# Patient Record
Sex: FEMALE | Race: OTHER
[De-identification: names, ages, dates, MRNs, and addresses within clinical notes are randomized per-mention and may not be internally consistent; named-entity substitution may affect disease eponyms.]

---

## 2019-01-23 ENCOUNTER — HOSPITAL ENCOUNTER (EMERGENCY)
Dept: HOSPITAL 72 - EMR | Age: 39
Discharge: HOME | End: 2019-01-23
Payer: MEDICAID

## 2019-01-23 VITALS — SYSTOLIC BLOOD PRESSURE: 118 MMHG | DIASTOLIC BLOOD PRESSURE: 77 MMHG

## 2019-01-23 VITALS — DIASTOLIC BLOOD PRESSURE: 60 MMHG | SYSTOLIC BLOOD PRESSURE: 116 MMHG

## 2019-01-23 VITALS — BODY MASS INDEX: 21 KG/M2 | WEIGHT: 123 LBS | HEIGHT: 64 IN

## 2019-01-23 DIAGNOSIS — R10.30: ICD-10-CM

## 2019-01-23 DIAGNOSIS — I88.9: Primary | ICD-10-CM

## 2019-01-23 DIAGNOSIS — K59.00: ICD-10-CM

## 2019-01-23 LAB
APPEARANCE UR: CLEAR
APTT PPP: (no result) S
GLUCOSE UR STRIP-MCNC: NEGATIVE MG/DL
KETONES UR QL STRIP: NEGATIVE
LEUKOCYTE ESTERASE UR QL STRIP: (no result)
NITRITE UR QL STRIP: NEGATIVE
PH UR STRIP: 7 [PH] (ref 4.5–8)
PROT UR QL STRIP: NEGATIVE
SP GR UR STRIP: 1.01 (ref 1–1.03)
UROBILINOGEN UR-MCNC: NORMAL MG/DL (ref 0–1)

## 2019-01-23 PROCEDURE — 74018 RADEX ABDOMEN 1 VIEW: CPT

## 2019-01-23 PROCEDURE — 99283 EMERGENCY DEPT VISIT LOW MDM: CPT

## 2019-01-23 PROCEDURE — 81003 URINALYSIS AUTO W/O SCOPE: CPT

## 2019-01-23 PROCEDURE — 81025 URINE PREGNANCY TEST: CPT

## 2019-01-23 NOTE — NUR
ED Nurse Note:



pt walked in c/o neck pain and swelling, abd pain and rectal pain x 3 wks, pt 
reports when she has bm it hurts more after. pt denies diarrhea or 
constipation, denies blood in bm. pt states she doens't know if she had 
hemorroids or not. abd soft nondistended active bs, airway intact, resp even 
and unlabored, will cont monitor.

## 2019-01-23 NOTE — NUR
ED Nurse Note:



pt discharge instruction provided w/ prescription, pt education done via 
discussion and hand out, wristband removed, pt advised to follow up with pcp or 
return to ed if s/s worsen or new s/s develop, pt verbalized understanding and 
agrees with plan.

## 2019-01-23 NOTE — EMERGENCY ROOM REPORT
History of Present Illness


General


Chief Complaint:  Sore Throat


Source:  Patient





Present Illness


HPI


Patient presents with 2 problems.  One is that she has pain on the outside of 

her trachea on the left-hand side.  There is a bump there.  She denies any 

trauma.  She's not noticed any fever.  She's tried taking Motrin.  This is been 

present for several days.  She has had this problem once before but it resolved 

on its own.  No medications were taken.





The second problem is that she has some lower abdominal pain associated with 

straining with her stools.  She denies any vomiting or diarrhea.  She states 

she has pain in her rectum.  There is no rash.  She rates the pain 9/10 and 

aching and pressure without radiation.  However there is some suprapubic 

tenderness and fullness.  There is no blood in the stool.





On amox for pulled tooth.  Has 5 more tablets to go.





She is never been evaluated for the problem in her neck or abdomen.





She denies diabetes, hypertension, abdominal problems in the past.


Allergies:  


Coded Allergies:  


     No Known Allergies (Unverified , 17)





Patient History


Past Medical History:  see triage record


Social History:  Denies: smoking


Social History Narrative


At home


Last Menstrual Period:  20


Pregnant Now:  No


:  0


Reviewed Nursing Documentation:  PMH: Agreed; PSxH: Agreed





Nursing Documentation-PMH


Past Medical History:  No Stated History





Review of Systems


All Other Systems:  negative except mentioned in HPI





Physical Exam





Vital Signs








  Date Time  Temp Pulse Resp B/P (MAP) Pulse Ox O2 Delivery O2 Flow Rate FiO2


 


19 20:57 98.1 68 17 123/77 98 Room Air  








Sp02 EP Interpretation:  reviewed, normal


General Appearance:  well appearing, no apparent distress, GCS 15


Head:  normocephalic


Eyes:  bilateral eye normal inspection, bilateral eye PERRL


ENT:  moist mucus membranes


Neck:  full range of motion, supple, thyroid normal, tender - Lymph node left 

anterior


Respiratory:  lungs clear, normal breath sounds


Cardiovascular #1:  regular rate, rhythm


Cardiovascular #2:  2+ radial (R)


Gastrointestinal:  normal inspection, normal bowel sounds, soft, no mass, non-

distended, no guarding, no rebound, tenderness - Minimal suprapubic and left 

lower quadrant


Genitourinary:  no CVA tenderness


Musculoskeletal:  back normal, gait/station normal, normal range of motion


Neurologic:  alert, oriented x3, grossly normal


Psychiatric:  mood/affect normal


Skin:  normal inspection, warm/dry, other - Moles anterior neck





Medical Decision Making


Diagnostic Impression:  


 Primary Impression:  


 Lymphadenitis


 Additional Impressions:  


 Constipated


 Qualified Codes:  K59.00 - Constipation, unspecified


 Abdominal pain


 Qualified Codes:  R10.30 - Lower abdominal pain, unspecified


ER Course


Patient presents with 2 problems.  She has had painful swelling area in the 

left anterior neck.  Differential includes lymphadenitis, cellulitis, strain 

amongst others.  There is a tender lymph node in that area.  This is just 

lymphadenitis.  In addition she complains about abdominal pain.  Differential 

includes diverticulitis, constipation, UTI amongst others.  Evaluation will be 

with urinalysis and x-ray.





Urinalysis without evidence of infection.  Pregnancy negative.  Abdominal films 

with increased stool load.  Patient had been treated here and pain is resolved.

  She is given Bactrim for the possible lymphadenitis in her neck and advised 

to stop amoxicillin.  Discussed results of x-ray and urinalysis.





Patient is improved and stable for outpatient observation and treatment.  

Patient was advised to return if the pain increased or if she was not doing 

well.  In addition she was told she needed to follow-up with her private 

physician.





Laboratory Tests








Test


  19


21:17


 


Urine Color Pale yellow  


 


Urine Appearance Clear  


 


Urine pH 7 (4.5-8.0)  


 


Urine Specific Gravity


  1.010


(1.005-1.035)


 


Urine Protein


  Negative


(NEGATIVE)


 


Urine Glucose (UA)


  Negative


(NEGATIVE)


 


Urine Ketones


  Negative


(NEGATIVE)


 


Urine Blood


  2+ (NEGATIVE)


H


 


Urine Nitrite


  Negative


(NEGATIVE)


 


Urine Bilirubin


  Negative


(NEGATIVE)


 


Urine Urobilinogen


  Normal MG/DL


(0.0-1.0)


 


Urine Leukocyte Esterase


  1+ (NEGATIVE)


H


 


Urine RBC


  2-4 /HPF (0 -


2)  H


 


Urine WBC


  0-2 /HPF (0 -


2)


 


Urine Squamous Epithelial


Cells Few /LPF


(NONE/OCC)


 


Urine Bacteria


  Few /HPF


(NONE)


 


Urine HCG, Qualitative


  Negative


(NEGATIVE)








Other X-Ray Diagnostic Results


Other X-Ray Diagnostic Results :  


   X-Ray ordered:  Abdomen


   # of Views/Limited Vs Complete:  2 View


   Indication:  Pain


   EP Interpretation:  Yes


   Interpretation:  nonspecific bowel gas, no sbo, other - Increased stool load


   Impression:  Other


   Electronically Signed by:  Electronically signed by eKlechi Vick MD





Last Vital Signs








  Date Time  Temp Pulse Resp B/P (MAP) Pulse Ox O2 Delivery O2 Flow Rate FiO2


 


19 23:19 98.2 67 16 116/60 100 Room Air  








Status:  improved


Disposition:  HOME, SELF-CARE


Condition:  Improved


Scripts


Ibuprofen* (MOTRIN*) 600 Mg Tablet


600 MG ORAL Q6H PRN for For Pain, #20 TAB


   Prov: Kelechi Vick MD         19 


Lactulose (LACTULOSE*) 20 Gm/30 Ml Solution


30 ML ORAL BID, #240 ML 0 Refills


   Prov: Kelechi Vick MD         19 


Acetaminophen (Tylenol) 325 Mg Tablet


650 MG ORAL Q6H PRN for Prn Pain/Headache/Temp > 101, #20 TAB 0 Refills


   Prov: Kelechi Vick MD         19 


Trimethoprim/Sulfamethoxazole 160/800* (BACTRIM DS TABLET*) 1 Each Tablet


1 TAB ORAL TWICE A DAY, #14 TAB


   Prov: Kelechi Vick MD         19











Kelechi Vick MD 2019 22:27

## 2019-01-24 NOTE — DIAGNOSTIC IMAGING REPORT
Indication: Abdominal pain

 

Technique: Supine view of the abdomen

 

Comparison: none

 

Findings: Unremarkable bowel gas pattern. No unusual masses or calcifications. The

bones are unremarkable

 

Impression: Negative

## 2019-02-02 ENCOUNTER — HOSPITAL ENCOUNTER (EMERGENCY)
Dept: HOSPITAL 72 - EMR | Age: 39
Discharge: HOME | End: 2019-02-02
Payer: MEDICAID

## 2019-02-02 VITALS — SYSTOLIC BLOOD PRESSURE: 140 MMHG | DIASTOLIC BLOOD PRESSURE: 85 MMHG

## 2019-02-02 VITALS — HEIGHT: 62 IN | BODY MASS INDEX: 22.63 KG/M2 | WEIGHT: 123 LBS

## 2019-02-02 VITALS — DIASTOLIC BLOOD PRESSURE: 85 MMHG | SYSTOLIC BLOOD PRESSURE: 140 MMHG

## 2019-02-02 DIAGNOSIS — F41.9: ICD-10-CM

## 2019-02-02 DIAGNOSIS — R07.89: Primary | ICD-10-CM

## 2019-02-02 PROCEDURE — 99283 EMERGENCY DEPT VISIT LOW MDM: CPT

## 2019-02-02 PROCEDURE — 93005 ELECTROCARDIOGRAM TRACING: CPT

## 2019-02-02 NOTE — NUR
ED Nurse Note:



Patient walk in c/o pressure chest pain, left sided weakness, blurry vision 
since yesterday at 1200. pt stating 5 /10 pain on chest that is niot radiating. 
seen by rayna. will continue to monitor.

## 2019-02-02 NOTE — EMERGENCY ROOM REPORT
History of Present Illness


General


Chief Complaint:  Chest Pain


Source:  Patient





Present Illness


HPI


This is a 38-year-old female with no past medical history.  She presents with 

chief complaint of chest pain and left-sided numbness.  She's been here 

multiple time for the same thing.  Denies any fever chills been ongoing for 

last 2 days.  She does complain of feeling nervous.  No nausea no vomiting.  No 

radiation.  No focal deficit.  Denies any other complaint.


Allergies:  


Coded Allergies:  


     No Known Allergies (Unverified , 8/5/17)





Patient History


Past Medical History:  none, see triage record, old chart reviewed


Past Surgical History:  none


Pertinent Family History:  none


Social History:  Denies: smoking


Last Menstrual Period:  1/3/2019


Pregnant Now:  No


Immunizations:  other


Reviewed Nursing Documentation:  PMH: Agreed; PSxH: Agreed





Nursing Documentation-PMH


Past Medical History:  No Stated History





Review of Systems


Eye:  Denies: eye pain, blurred vision


ENT:  Denies: ear pain, nose congestion, throat swelling


Respiratory:  Denies: cough, shortness of breath


Cardiovascular:  Reports: chest pain; Denies: palpitations


Gastrointestinal:  Denies: abdominal pain, diarrhea, nausea, vomiting


Musculoskeletal:  Denies: back pain, joint pain


Skin:  Denies: rash


Neurological:  Denies: headache, numbness


Endocrine:  Denies: increased thirst, increased urine


Hematologic/Lymphatic:  Denies: easy bruising


All Other Systems:  negative except mentioned in HPI





Physical Exam





Vital Signs








  Date Time  Temp Pulse Resp B/P (MAP) Pulse Ox O2 Delivery O2 Flow Rate FiO2


 


2/2/19 20:28 97.3 78 16 140/85 98 Room Air  





vitals normal


Sp02 EP Interpretation:  reviewed, normal


General Appearance:  well appearing, no apparent distress, alert


Head:  normocephalic, atraumatic


Eyes:  bilateral eye PERRL, bilateral eye EOMI


ENT:  hearing grossly normal, normal pharynx


Neck:  full range of motion, supple, no meningismus


Respiratory:  chest non-tender, lungs clear, normal breath sounds


Cardiovascular #1:  regular rate, rhythm, no murmur


Gastrointestinal:  normal bowel sounds, non tender, no mass, no organomegaly, 

no bruit, non-distended


Musculoskeletal:  back normal, gait/station normal, normal range of motion


Psychiatric:  mood/affect normal


Skin:  warm/dry





Medical Decision Making


Diagnostic Impression:  


 Primary Impression:  


 Chest pain


 Qualified Codes:  R07.9 - Chest pain, unspecified


 Additional Impression:  


 Anxiety


ER Course


Patient presents with atypical chest pain.  Most likely anxiety related.  No 

evidence of ACS, PE, dissection to name a few.  No evidence of CVA.


EKG Diagnostic Results


Rate:  normal


Rhythm:  NSR


ST Segments:  no acute changes


ASA given to the pt in ED:  No





Rhythm Strip Diag. Results


EP Interpretation:  yes


Rate:  61


Rhythm:  NSR, no PVC's, no ectopy





Last Vital Signs








  Date Time  Temp Pulse Resp B/P (MAP) Pulse Ox O2 Delivery O2 Flow Rate FiO2


 


2/2/19 20:28 97.3 78 16 140/85 98 Room Air  








Status:  improved


Disposition:  HOME, SELF-CARE


Condition:  Stable


Scripts


Buspirone Hcl* (BUSPAR*) 10 Mg Tablet


10 MG ORAL THREE TIMES A DAY, #15 TAB 0 Refills


   Prov: Rohan Saleem MD         2/2/19


Patient Instructions:  Nonspecific Chest Pain





Additional Instructions:  


Follow up with your Dr. in 7 days.  Return if symptom worsen.











Rohan Saleem MD Feb 2, 2019 21:16

## 2019-02-02 NOTE — NUR
ED Nurse Note:





Patient cleared for discharge per ERMD. VSS. Patient  given prescriptions and 
discharge instructions; verbalized understanding.  ID band removed. Patient 
ambulated steady with all personal belongings.

## 2019-02-05 ENCOUNTER — HOSPITAL ENCOUNTER (EMERGENCY)
Dept: HOSPITAL 72 - EMR | Age: 39
Discharge: HOME | End: 2019-02-05
Payer: MEDICAID

## 2019-02-05 VITALS — SYSTOLIC BLOOD PRESSURE: 122 MMHG | DIASTOLIC BLOOD PRESSURE: 72 MMHG

## 2019-02-05 VITALS — HEIGHT: 62 IN | BODY MASS INDEX: 22.63 KG/M2 | WEIGHT: 123 LBS

## 2019-02-05 VITALS — DIASTOLIC BLOOD PRESSURE: 72 MMHG | SYSTOLIC BLOOD PRESSURE: 122 MMHG

## 2019-02-05 VITALS — DIASTOLIC BLOOD PRESSURE: 74 MMHG | SYSTOLIC BLOOD PRESSURE: 120 MMHG

## 2019-02-05 DIAGNOSIS — R07.9: Primary | ICD-10-CM

## 2019-02-05 PROCEDURE — 99283 EMERGENCY DEPT VISIT LOW MDM: CPT

## 2019-02-05 NOTE — NUR
ED Nurse Note:



Patient walk in c/o left side chest pain radiating to back for 3x weeks. 
Patient reports burning sensation. Patient reports 5/10 pain. Patient states 
her PCP told her she had inflamation in the neck. pt is alert and oriented 
times 4. pt presents with normal sinus rhythm with other virtal signs within 
normal limits.

## 2019-02-05 NOTE — EMERGENCY ROOM REPORT
History of Present Illness


General


Chief Complaint:  Chest Pain


Source:  Patient





Present Illness


HPI


Is a 38-year-old female with no past medical history.  She presents with chief 

complaint of left-sided chest pain.  This been a chronic problem for weeks.  I 

saw her several times for this already.  She said his skin is burning in 

sensation.  His go up her neck.  No nausea no vomiting.  No fever or chills.  

She had workup with negative EKG.  Her neck pain and CT done with was negative 

for any issue.  I tried giving her BuSpar without any relief.  Denies any other 

complaint.  Nothing made it better.  Nothing made it worse.


Allergies:  


Coded Allergies:  


     No Known Allergies (Unverified , 8/5/17)





Patient History


Past Medical History:  see triage record, old chart reviewed


Past Surgical History:  none


Pertinent Family History:  none


Social History:  Denies: smoking


Last Menstrual Period:  2/3/2019


Pregnant Now:  No


Immunizations:  other


Reviewed Nursing Documentation:  PMH: Agreed; PSxH: Agreed





Nursing Documentation-PMH


Past Medical History:  No Stated History





Review of Systems


Eye:  Denies: eye pain, blurred vision


ENT:  Denies: ear pain, nose congestion, throat swelling


Respiratory:  Denies: cough, shortness of breath


Cardiovascular:  Reports: chest pain; Denies: palpitations


Gastrointestinal:  Denies: abdominal pain, diarrhea, nausea, vomiting


Musculoskeletal:  Denies: back pain, joint pain


Skin:  Denies: rash


Neurological:  Denies: headache, numbness


Endocrine:  Denies: increased thirst, increased urine


Hematologic/Lymphatic:  Denies: easy bruising


All Other Systems:  negative except mentioned in HPI





Physical Exam





Vital Signs








  Date Time  Temp Pulse Resp B/P (MAP) Pulse Ox O2 Delivery O2 Flow Rate FiO2


 


2/5/19 20:25 98.2 76 16 122/72 96 Room Air  


 


2/5/19 20:32        99





vitals normal


Sp02 EP Interpretation:  reviewed, normal


General Appearance:  well appearing, no apparent distress, alert


Head:  normocephalic, atraumatic


Eyes:  bilateral eye PERRL, bilateral eye EOMI


ENT:  hearing grossly normal, normal pharynx


Neck:  full range of motion, supple, no meningismus


Respiratory:  chest non-tender, lungs clear, normal breath sounds


Cardiovascular #1:  regular rate, rhythm, no murmur


Gastrointestinal:  normal bowel sounds, non tender, no mass, no organomegaly, 

no bruit, non-distended


Musculoskeletal:  back normal, gait/station normal, normal range of motion


Neurologic:  alert, oriented x3


Psychiatric:  anxious


Skin:  warm/dry





Medical Decision Making


Diagnostic Impression:  


 Primary Impression:  


 Chest pain


 Qualified Codes:  R07.9 - Chest pain, unspecified


ER Course


Patient with atypical chest pain.  No cardiac issue.  EKG normal.  This may be 

anxiety related.  Could be reflux.  No acute process or bloodwork or admission.

  We'll discharge home with reassurance and follow up with primary care doctor.


EKG Diagnostic Results


Rate:  normal


Rhythm:  NSR


ST Segments:  no acute changes





Rhythm Strip Diag. Results


EP Interpretation:  yes


Rate:  62


Rhythm:  NSR, no PVC's, no ectopy





Last Vital Signs








  Date Time  Temp Pulse Resp B/P (MAP) Pulse Ox O2 Delivery O2 Flow Rate FiO2


 


2/5/19 20:32 98.2 71 16 122/72 96 Room Air  


 


2/5/19 20:32        99








Status:  unchanged


Disposition:  HOME, SELF-CARE


Condition:  Stable


Scripts


Omeprazole Magnesium (PRILOSEC OTC) 20 Mg Tablet.


20 MG ORAL DAILY, #30 TAB


   Prov: Rohan Saleem MD         2/5/19


Patient Instructions:  Nonspecific Chest Pain





Additional Instructions:  


Follow-up with your doctor in 7 days.  Return if symptom worsen.











Rohan Saleem MD Feb 5, 2019 21:01

## 2019-02-05 NOTE — NUR
ED Nurse Note:



PT is DC per ERMD order. pt is alert and oriented times 4 with neuro checks 
within normal limits. pt is able to ambulate with no complications. pt vital 
signs, status, and condition is reported to ERMD prior to DC. pt is instructed 
to follow up with primary MD as soon as possible. pt has left with all 
belongings and ID band removed. pt has left with all DC paper paperwork and 
prescriptions and shows full understanding of paperwork and DC instructions. pt 
is instructed to return and report to ER if any variance in condition. pt is 
stable for Dc.

## 2019-06-16 ENCOUNTER — HOSPITAL ENCOUNTER (EMERGENCY)
Dept: HOSPITAL 72 - EMR | Age: 39
Discharge: HOME | End: 2019-06-16
Payer: MEDICAID

## 2019-06-16 VITALS — SYSTOLIC BLOOD PRESSURE: 117 MMHG | DIASTOLIC BLOOD PRESSURE: 80 MMHG

## 2019-06-16 VITALS — BODY MASS INDEX: 22.2 KG/M2 | HEIGHT: 64 IN | WEIGHT: 130 LBS

## 2019-06-16 DIAGNOSIS — R11.0: ICD-10-CM

## 2019-06-16 DIAGNOSIS — R10.30: Primary | ICD-10-CM

## 2019-06-16 DIAGNOSIS — R42: ICD-10-CM

## 2019-06-16 LAB
ADD MANUAL DIFF: NO
ALBUMIN SERPL-MCNC: 4.2 G/DL (ref 3.4–5)
ALBUMIN/GLOB SERPL: 1.4 {RATIO} (ref 1–2.7)
ALP SERPL-CCNC: 111 U/L (ref 46–116)
ALT SERPL-CCNC: 31 U/L (ref 12–78)
ANION GAP SERPL CALC-SCNC: 8 MMOL/L (ref 5–15)
APPEARANCE UR: CLEAR
APTT PPP: (no result) S
AST SERPL-CCNC: 18 U/L (ref 15–37)
BASOPHILS NFR BLD AUTO: 1.4 % (ref 0–2)
BILIRUB SERPL-MCNC: 0.2 MG/DL (ref 0.2–1)
BUN SERPL-MCNC: 15 MG/DL (ref 7–18)
CALCIUM SERPL-MCNC: 9 MG/DL (ref 8.5–10.1)
CHLORIDE SERPL-SCNC: 105 MMOL/L (ref 98–107)
CO2 SERPL-SCNC: 28 MMOL/L (ref 21–32)
CREAT SERPL-MCNC: 0.6 MG/DL (ref 0.55–1.3)
EOSINOPHIL NFR BLD AUTO: 3.1 % (ref 0–3)
ERYTHROCYTE [DISTWIDTH] IN BLOOD BY AUTOMATED COUNT: 10.1 % (ref 11.6–14.8)
GLOBULIN SER-MCNC: 2.9 G/DL
GLUCOSE UR STRIP-MCNC: NEGATIVE MG/DL
HCT VFR BLD CALC: 35.9 % (ref 37–47)
HGB BLD-MCNC: 13.1 G/DL (ref 12–16)
KETONES UR QL STRIP: NEGATIVE
LEUKOCYTE ESTERASE UR QL STRIP: NEGATIVE
LYMPHOCYTES NFR BLD AUTO: 37.9 % (ref 20–45)
MCV RBC AUTO: 86 FL (ref 80–99)
MONOCYTES NFR BLD AUTO: 8.9 % (ref 1–10)
NEUTROPHILS NFR BLD AUTO: 48.8 % (ref 45–75)
NITRITE UR QL STRIP: NEGATIVE
PH UR STRIP: 5 [PH] (ref 4.5–8)
PLATELET # BLD: 235 K/UL (ref 150–450)
POTASSIUM SERPL-SCNC: 3.9 MMOL/L (ref 3.5–5.1)
PROT UR QL STRIP: NEGATIVE
RBC # BLD AUTO: 4.18 M/UL (ref 4.2–5.4)
SODIUM SERPL-SCNC: 141 MMOL/L (ref 136–145)
SP GR UR STRIP: 1.02 (ref 1–1.03)
UROBILINOGEN UR-MCNC: NORMAL MG/DL (ref 0–1)
WBC # BLD AUTO: 7.7 K/UL (ref 4.8–10.8)

## 2019-06-16 PROCEDURE — 74176 CT ABD & PELVIS W/O CONTRAST: CPT

## 2019-06-16 PROCEDURE — 81025 URINE PREGNANCY TEST: CPT

## 2019-06-16 PROCEDURE — 36415 COLL VENOUS BLD VENIPUNCTURE: CPT

## 2019-06-16 PROCEDURE — 85025 COMPLETE CBC W/AUTO DIFF WBC: CPT

## 2019-06-16 PROCEDURE — 83690 ASSAY OF LIPASE: CPT

## 2019-06-16 PROCEDURE — 96374 THER/PROPH/DIAG INJ IV PUSH: CPT

## 2019-06-16 PROCEDURE — 96375 TX/PRO/DX INJ NEW DRUG ADDON: CPT

## 2019-06-16 PROCEDURE — 80053 COMPREHEN METABOLIC PANEL: CPT

## 2019-06-16 PROCEDURE — 99284 EMERGENCY DEPT VISIT MOD MDM: CPT

## 2019-06-16 PROCEDURE — 81003 URINALYSIS AUTO W/O SCOPE: CPT

## 2019-06-16 NOTE — NUR
ED Nurse Note:

PT FROM HOME WITH C/O MID ABD PAIN X 2 DAYS INTERMITTENTLY AT 7/10 AND ACHING, 
ALSO W/ NAUSEA, NO EMESIS, DIARRHEA OR FEVERS. Pt is A&Ox4. pt resting in bed.

## 2019-06-16 NOTE — EMERGENCY ROOM REPORT
History of Present Illness


General


Chief Complaint:  Abdominal Pain


Source:  Patient, Medical Record





Present Illness


HPI


This is a 38-year-old female with history of anxiety.  She presents with chief 

complaint of abdominal pain.  Onset for the last 2 weeks.  On and off.  Pain 

localized to lower quadrant.  Says she has nausea but no vomiting.  No 

diarrhea.  No pain with urination.  Pain is 8 out of 10.  Also felt dizzy.  

Denies any other complaint.


Allergies:  


Coded Allergies:  


     No Known Allergies (Unverified , 8/5/17)





Patient History


Past Medical History:  see triage record, old chart reviewed


Past Surgical History:  none


Pertinent Family History:  none


Social History:  Denies: smoking


Last Menstrual Period:  06-


Pregnant Now:  No


Immunizations:  other


Reviewed Nursing Documentation:  PMH: Agreed; PSxH: Agreed





Nursing Documentation-PMH


Past Medical History:  No Stated History





Review of Systems


Eye:  Denies: eye pain, blurred vision


ENT:  Denies: ear pain, nose congestion, throat swelling


Respiratory:  Denies: cough, shortness of breath


Cardiovascular:  Denies: chest pain, palpitations


Gastrointestinal:  Reports: abdominal pain, nausea; Denies: diarrhea, vomiting


Musculoskeletal:  Denies: back pain, joint pain


Skin:  Denies: rash


Neurological:  Denies: headache, numbness


Endocrine:  Denies: increased thirst, increased urine


Hematologic/Lymphatic:  Denies: easy bruising


All Other Systems:  negative except mentioned in HPI





Physical Exam





Vital Signs








  Date Time  Temp Pulse Resp B/P (MAP) Pulse Ox O2 Delivery O2 Flow Rate FiO2


 


6/16/19 20:54 98.1 68 18 117/78 (91) 97 Room Air  





Vitals normal


Sp02 EP Interpretation:  reviewed, normal


General Appearance:  well appearing, no apparent distress, alert


Head:  normocephalic, atraumatic


Eyes:  bilateral eye PERRL, bilateral eye EOMI


ENT:  hearing grossly normal, normal pharynx


Neck:  full range of motion, supple, no meningismus


Respiratory:  chest non-tender, lungs clear, normal breath sounds


Cardiovascular #1:  regular rate, rhythm, no murmur


Gastrointestinal:  normal bowel sounds, no mass, no organomegaly, no bruit, non-

distended, tenderness - Mild, lower quadrants


Musculoskeletal:  back normal, gait/station normal, normal range of motion


Psychiatric:  mood/affect normal


Skin:  warm/dry





Medical Decision Making


Diagnostic Impression:  


 Primary Impression:  


 Abdominal pain


 Qualified Codes:  R10.30 - Lower abdominal pain, unspecified


ER Course


Patient presents with lower quadrant abdominal pain and cramp.  Abdominal exam 

benign.  CT scan negative.  She does have microscopic hematuria.  This may be 

starting of her menstrual.  No evidence of an acute abdomen.  Will discharge 

home.


CT/MRI/US Diagnostic Results


CT/MRI/US Diagnostic Results :  


   Imaging Test Ordered:  CT abdomen and pelvis


   Impression


Read by radiologist.  Negative.





Last Vital Signs








  Date Time  Temp Pulse Resp B/P (MAP) Pulse Ox O2 Delivery O2 Flow Rate FiO2


 


6/16/19 20:54 98.1 68 18 117/78 (91) 97 Room Air  








Status:  improved


Disposition:  HOME, SELF-CARE


Condition:  Stable


Scripts


Ibuprofen* (MOTRIN*) 600 Mg Tablet


600 MG ORAL THREE TIMES A DAY, #30 TAB 0 Refills


   Prov: Rohan Saleem MD         6/16/19


Patient Instructions:  Abdominal Pain, Adult





Additional Instructions:  


Follow-up with your doctor in 7 days.  Return if worse.











Rohan Saleem MD Jun 16, 2019 21:07

## 2019-06-17 NOTE — DIAGNOSTIC IMAGING REPORT
Indication: Abdominal pain

 

Technique: Spiral acquisitions obtained through the abdomen and pelvis. No oral

contrast utilized, per emergency room physician request No IV contrast utilized,  per

referring physician request

 

. Multiplanar reconstructions were generated. Total dose length product 679.68 mGycm.

CTDIvol(s) 12.33 mGy. Dose reduction achieved using automated exposure control

 

 

Comparison: None

 

Findings: Normal appendix. There is an ascending colon diverticulum noted. No

evidence of diverticulitis.. No small bowel distention. No free or loculated

intraperitoneal gas or fluid is evident. The distal esophagus, stomach, duodenum are

unremarkable.

 

Lack of IV contrast limits assessment of the solid organs. The gallbladder is

nondistended. The bile ducts are nondilated. The liver, pancreas, spleen, adrenals,

kidneys are unremarkable. No retroperitoneal or mesenteric mass or adenopathy. No

pelvic mass or adenopathy. Uterus and ovaries are unremarkable.

 

The included lung bases demonstrate some of hazy groundglass opacity, likely

secondary to dependent atelectatic changes. The bones are unremarkable.

 

Impression: Essentially unremarkable exam

 

Nonspecific hazy basilar pulmonary parenchymal changes, probably on the basis of

dependent atelectatic changes

 

This agrees with the preliminary interpretation provided overnight by [facility]

teleradiology service.

 

 

 

The CT scanner at [facility] is accredited by the [employer] College of

Radiology and the scans are performed using protocols designed to limit radiation

exposure to as low as reasonably achievable to attain images of sufficient resolution

adequate for diagnostic evaluation.